# Patient Record
Sex: FEMALE | Race: WHITE | NOT HISPANIC OR LATINO | URBAN - NONMETROPOLITAN AREA
[De-identification: names, ages, dates, MRNs, and addresses within clinical notes are randomized per-mention and may not be internally consistent; named-entity substitution may affect disease eponyms.]

---

## 2022-03-02 ENCOUNTER — CLAIMS CREATED FROM THE CLAIM WINDOW (OUTPATIENT)
Dept: URBAN - NONMETROPOLITAN AREA CLINIC 9 | Facility: CLINIC | Age: 62
End: 2022-03-02
Payer: COMMERCIAL

## 2022-03-02 VITALS
HEIGHT: 66 IN | HEART RATE: 71 BPM | WEIGHT: 182 LBS | DIASTOLIC BLOOD PRESSURE: 76 MMHG | BODY MASS INDEX: 29.25 KG/M2 | SYSTOLIC BLOOD PRESSURE: 153 MMHG | OXYGEN SATURATION: 96 %

## 2022-03-02 DIAGNOSIS — K21.9 GASTROESOPHAGEAL REFLUX DISEASE, UNSPECIFIED WHETHER ESOPHAGITIS PRESENT: ICD-10-CM

## 2022-03-02 DIAGNOSIS — R19.5 CHANGE IN STOOL CALIBER: ICD-10-CM

## 2022-03-02 DIAGNOSIS — Z12.11 COLON CANCER SCREENING: ICD-10-CM

## 2022-03-02 PROCEDURE — 99204 OFFICE O/P NEW MOD 45 MIN: CPT | Performed by: INTERNAL MEDICINE

## 2022-03-02 PROCEDURE — 99204 OFFICE O/P NEW MOD 45 MIN: CPT | Performed by: PHYSICIAN ASSISTANT

## 2022-03-02 RX ORDER — SODIUM SULFATE, MAGNESIUM SULFATE, AND POTASSIUM CHLORIDE 17.75; 2.7; 2.25 G/1; G/1; G/1
12 TABLETS TABLET ORAL
Qty: 24 TABLETS | Refills: 0 | OUTPATIENT
Start: 2022-03-02 | End: 2022-03-03

## 2022-03-02 RX ORDER — FAMOTIDINE 20 MG/1
1 TABLET AT BEDTIME AS NEEDED TABLET, FILM COATED ORAL ONCE A DAY
Status: ACTIVE | COMMUNITY

## 2022-03-02 RX ORDER — PANTOPRAZOLE SODIUM 40 MG/1
1 TABLET TABLET, DELAYED RELEASE ORAL
Qty: 30 | Refills: 3 | OUTPATIENT
Start: 2022-03-02

## 2022-03-02 NOTE — PHYSICAL EXAM NECK/THYROID:
normal appearance , without tenderness upon palpation , no deformities , trachea midline , Thyroid normal size , no thyroid nodules , no masses , no JVD , thyroid nontender pt is harm to self

## 2022-03-02 NOTE — HPI-TODAY'S VISIT:
Lenora Costello is a 62 y/o female who presents to discuss a few concerns. First, she mentions over the last year that she has had a lot more reflux symptoms. Her dentist also mentioned seeing some changes in her teeth concerning for reflux, and advised that she start taking Pepcid nightly which she has been on for the past year. She continues, however, to have break through symptoms several times per week with burning in the upper abdomen and chest and a metallic taste in her mouth; no dysphagia; no NSAID use. She denies any significant nocturnal symptoms. She has never had an EGD. She also mentions intermittent smaller caliber stool which she has noticed since June. She takes a fiber supplement in her coffee daily; stil having a movement daily and denies blood in stool. Her last colonoscopy was 10 years ago and she denies personal h/o polyps or known family h/o CRC or polyps. Weight is stable.

## 2022-04-27 ENCOUNTER — OFFICE VISIT (OUTPATIENT)
Dept: URBAN - NONMETROPOLITAN AREA MEDICAL CENTER 2 | Facility: MEDICAL CENTER | Age: 62
End: 2022-04-27
Payer: COMMERCIAL

## 2022-04-27 DIAGNOSIS — D12.5 ADENOMA OF SIGMOID COLON: ICD-10-CM

## 2022-04-27 DIAGNOSIS — Z12.11 COLON CANCER SCREENING: ICD-10-CM

## 2022-04-27 DIAGNOSIS — K25.9 ANTRAL ULCER: ICD-10-CM

## 2022-04-27 DIAGNOSIS — K21.9 ACID REFLUX: ICD-10-CM

## 2022-04-27 PROCEDURE — 43239 EGD BIOPSY SINGLE/MULTIPLE: CPT | Performed by: INTERNAL MEDICINE

## 2022-04-27 PROCEDURE — 45385 COLONOSCOPY W/LESION REMOVAL: CPT | Performed by: INTERNAL MEDICINE

## 2022-06-16 ENCOUNTER — WEB ENCOUNTER (OUTPATIENT)
Dept: URBAN - NONMETROPOLITAN AREA CLINIC 9 | Facility: CLINIC | Age: 62
End: 2022-06-16

## 2022-06-22 ENCOUNTER — OFFICE VISIT (OUTPATIENT)
Dept: URBAN - NONMETROPOLITAN AREA CLINIC 9 | Facility: CLINIC | Age: 62
End: 2022-06-22
Payer: COMMERCIAL

## 2022-06-22 VITALS
BODY MASS INDEX: 29.18 KG/M2 | WEIGHT: 181.6 LBS | HEIGHT: 66 IN | SYSTOLIC BLOOD PRESSURE: 140 MMHG | HEART RATE: 77 BPM | DIASTOLIC BLOOD PRESSURE: 79 MMHG

## 2022-06-22 DIAGNOSIS — K64.9 HEMORRHOIDS WITHOUT COMPLICATION: ICD-10-CM

## 2022-06-22 DIAGNOSIS — Z86.010 PERSONAL HISTORY OF COLONIC POLYPS: ICD-10-CM

## 2022-06-22 DIAGNOSIS — K59.01 SLOW TRANSIT CONSTIPATION: ICD-10-CM

## 2022-06-22 DIAGNOSIS — K21.9 GERD WITHOUT ESOPHAGITIS: ICD-10-CM

## 2022-06-22 PROCEDURE — 99214 OFFICE O/P EST MOD 30 MIN: CPT | Performed by: INTERNAL MEDICINE

## 2022-06-22 PROCEDURE — 46221 LIGATION OF HEMORRHOID(S): CPT | Performed by: INTERNAL MEDICINE

## 2022-06-22 RX ORDER — PANTOPRAZOLE SODIUM 40 MG/1
1 TABLET TABLET, DELAYED RELEASE ORAL
Qty: 30 | Refills: 3 | Status: ACTIVE | COMMUNITY
Start: 2022-03-02

## 2022-06-22 RX ORDER — FAMOTIDINE 20 MG/1
1 TABLET AT BEDTIME AS NEEDED TABLET, FILM COATED ORAL ONCE A DAY
Status: ACTIVE | COMMUNITY

## 2022-06-22 RX ORDER — PANTOPRAZOLE SODIUM 40 MG/1
1 TABLET TABLET, DELAYED RELEASE ORAL
OUTPATIENT
Start: 2022-03-02

## 2022-06-22 NOTE — HPI-TODAY'S VISIT:
Pt seen 3/2022 for one year of GERD symptoms with acid reflux and enamel changes on teeth despite Pepcid nightly; also having intermittent smaller caliber stool  with last CRCS 10  years ago. 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 Pt now presents for a follow up: GERD better on Pantoprazole once a day. Constipation better with fiber. One hemorrhoid hangs out and can cause discomfort and requesting therapy for.

## 2022-06-27 ENCOUNTER — WEB ENCOUNTER (OUTPATIENT)
Dept: URBAN - NONMETROPOLITAN AREA CLINIC 9 | Facility: CLINIC | Age: 62
End: 2022-06-27

## 2022-06-27 RX ORDER — PANTOPRAZOLE SODIUM 40 MG/1
1 TABLET TABLET, DELAYED RELEASE ORAL
Qty: 90 TABLET | Refills: 1
Start: 2022-03-02

## 2022-07-07 ENCOUNTER — OFFICE VISIT (OUTPATIENT)
Dept: URBAN - METROPOLITAN AREA CLINIC 46 | Facility: CLINIC | Age: 62
End: 2022-07-07

## 2022-08-31 ENCOUNTER — OFFICE VISIT (OUTPATIENT)
Dept: URBAN - NONMETROPOLITAN AREA CLINIC 9 | Facility: CLINIC | Age: 62
End: 2022-08-31

## 2022-09-14 ENCOUNTER — OFFICE VISIT (OUTPATIENT)
Dept: URBAN - NONMETROPOLITAN AREA CLINIC 9 | Facility: CLINIC | Age: 62
End: 2022-09-14
Payer: COMMERCIAL

## 2022-09-14 VITALS
HEIGHT: 66 IN | WEIGHT: 178.4 LBS | HEART RATE: 71 BPM | BODY MASS INDEX: 28.67 KG/M2 | DIASTOLIC BLOOD PRESSURE: 94 MMHG | SYSTOLIC BLOOD PRESSURE: 152 MMHG

## 2022-09-14 DIAGNOSIS — Z86.010 PERSONAL HISTORY OF COLONIC POLYPS: ICD-10-CM

## 2022-09-14 DIAGNOSIS — K64.9 HEMORRHOIDS WITHOUT COMPLICATION: ICD-10-CM

## 2022-09-14 DIAGNOSIS — K59.01 SLOW TRANSIT CONSTIPATION: ICD-10-CM

## 2022-09-14 DIAGNOSIS — K21.9 GERD WITHOUT ESOPHAGITIS: ICD-10-CM

## 2022-09-14 PROCEDURE — 99213 OFFICE O/P EST LOW 20 MIN: CPT | Performed by: INTERNAL MEDICINE

## 2022-09-14 RX ORDER — PANTOPRAZOLE SODIUM 40 MG/1
1 TABLET TABLET, DELAYED RELEASE ORAL
OUTPATIENT

## 2022-09-14 RX ORDER — PANTOPRAZOLE SODIUM 40 MG/1
1 TABLET TABLET, DELAYED RELEASE ORAL
Qty: 90 TABLET | Refills: 1 | Status: ACTIVE | COMMUNITY
Start: 2022-03-02

## 2022-09-14 NOTE — HPI-TODAY'S VISIT:
3/2/2022: Lenora Costello is a 62 y/o female who presents to discuss a few concerns. First, she mentions over the last year that she has had a lot more reflux symptoms. Her dentist also mentioned seeing some changes in her teeth concerning for reflux, and advised that she start taking Pepcid nightly which she has been on for the past year. She continues, however, to have break through symptoms several times per week with burning in the upper abdomen and chest and a metallic taste in her mouth; no dysphagia; no NSAID use. She denies any significant nocturnal symptoms. She has never had an EGD. She also mentions intermittent smaller caliber stool which she has noticed since June. She takes a fiber supplement in her coffee daily; stil having a movement daily and denies blood in stool. Her last colonoscopy was 10 years ago and she denies personal h/o polyps or known family h/o CRC or polyps. Weight is stable. EGD and colonoscopy advisd and Pantoprazole 40mg daily started   4/7/2022: EGD and colonoscopy performed. H pylori negative gastropathy; colon adenomas; hemorrhoids.   6/22/2022: Pt now presents for a follow up: GERD better on Pantoprazole once a day. Constipation better with fiber. One hemorrhoid hangs out and can cause discomfort and requesting therapy for. CRH banding in the LL position.   9/12/2022: Pt now for a follow up. GERD still controlled with Pantoprazole 40mg daily. Stool normal caliber with fiber. Banding helped but hemorrhoids still spontaneously flare.

## 2022-09-22 ENCOUNTER — TELEPHONE ENCOUNTER (OUTPATIENT)
Dept: URBAN - METROPOLITAN AREA CLINIC 46 | Facility: CLINIC | Age: 62
End: 2022-09-22

## 2022-09-29 ENCOUNTER — WEB ENCOUNTER (OUTPATIENT)
Dept: URBAN - METROPOLITAN AREA SURGERY CENTER 28 | Facility: SURGERY CENTER | Age: 62
End: 2022-09-29

## 2022-10-03 ENCOUNTER — OFFICE VISIT (OUTPATIENT)
Dept: URBAN - METROPOLITAN AREA SURGERY CENTER 28 | Facility: SURGERY CENTER | Age: 62
End: 2022-10-03
Payer: COMMERCIAL

## 2022-10-03 DIAGNOSIS — K92.1 ACUTE MELENA: ICD-10-CM

## 2022-10-03 DIAGNOSIS — K64.2 BLEEDING GRADE III HEMORRHOIDS: ICD-10-CM

## 2022-10-03 PROCEDURE — G8907 PT DOC NO EVENTS ON DISCHARG: HCPCS | Performed by: INTERNAL MEDICINE

## 2022-10-03 PROCEDURE — 45330 DIAGNOSTIC SIGMOIDOSCOPY: CPT | Performed by: INTERNAL MEDICINE

## 2022-10-03 PROCEDURE — 46930 DESTROY INTERNAL HEMORRHOIDS: CPT | Performed by: INTERNAL MEDICINE

## 2022-10-03 RX ORDER — HYDROCODONE BITARTRATE AND ACETAMINOPHEN 7.5; 325 MG/1; MG/1
1 TABLET AS NEEDED TABLET ORAL
Qty: 12 TABLET | Refills: 0 | OUTPATIENT
Start: 2022-10-03 | End: 2022-10-06

## 2022-10-03 RX ORDER — PANTOPRAZOLE SODIUM 40 MG/1
1 TABLET TABLET, DELAYED RELEASE ORAL
Status: ACTIVE | COMMUNITY

## 2022-12-06 ENCOUNTER — DASHBOARD ENCOUNTERS (OUTPATIENT)
Age: 62
End: 2022-12-06

## 2022-12-07 ENCOUNTER — OFFICE VISIT (OUTPATIENT)
Dept: URBAN - NONMETROPOLITAN AREA CLINIC 9 | Facility: CLINIC | Age: 62
End: 2022-12-07
Payer: COMMERCIAL

## 2022-12-07 ENCOUNTER — OFFICE VISIT (OUTPATIENT)
Dept: URBAN - NONMETROPOLITAN AREA CLINIC 9 | Facility: CLINIC | Age: 62
End: 2022-12-07

## 2022-12-07 VITALS
HEIGHT: 66 IN | WEIGHT: 178.2 LBS | DIASTOLIC BLOOD PRESSURE: 83 MMHG | BODY MASS INDEX: 28.64 KG/M2 | HEART RATE: 70 BPM | SYSTOLIC BLOOD PRESSURE: 140 MMHG

## 2022-12-07 DIAGNOSIS — K21.9 GERD WITHOUT ESOPHAGITIS: ICD-10-CM

## 2022-12-07 DIAGNOSIS — K59.01 SLOW TRANSIT CONSTIPATION: ICD-10-CM

## 2022-12-07 DIAGNOSIS — K64.9 HEMORRHOIDS WITHOUT COMPLICATION: ICD-10-CM

## 2022-12-07 DIAGNOSIS — Z86.010 PERSONAL HISTORY OF COLONIC POLYPS: ICD-10-CM

## 2022-12-07 PROBLEM — 266435005: Status: ACTIVE | Noted: 2022-06-22

## 2022-12-07 PROBLEM — 428283002: Status: ACTIVE | Noted: 2022-06-22

## 2022-12-07 PROBLEM — 35298007: Status: ACTIVE | Noted: 2022-06-22

## 2022-12-07 PROCEDURE — 99213 OFFICE O/P EST LOW 20 MIN: CPT | Performed by: INTERNAL MEDICINE

## 2022-12-07 RX ORDER — PANTOPRAZOLE SODIUM 40 MG/1
1 TABLET TABLET, DELAYED RELEASE ORAL
OUTPATIENT

## 2022-12-07 RX ORDER — PANTOPRAZOLE SODIUM 40 MG/1
1 TABLET TABLET, DELAYED RELEASE ORAL
Status: ACTIVE | COMMUNITY

## 2022-12-07 NOTE — HPI-TODAY'S VISIT:
3/2/2022: Lenora Costello is a 62 y/o female who presents to discuss a few concerns. First, she mentions over the last year that she has had a lot more reflux symptoms. Her dentist also mentioned seeing some changes in her teeth concerning for reflux, and advised that she start taking Pepcid nightly which she has been on for the past year. She continues, however, to have break through symptoms several times per week with burning in the upper abdomen and chest and a metallic taste in her mouth; no dysphagia; no NSAID use. She denies any significant nocturnal symptoms. She has never had an EGD. She also mentions intermittent smaller caliber stool which she has noticed since June. She takes a fiber supplement in her coffee daily; stil having a movement daily and denies blood in stool. Her last colonoscopy was 10 years ago and she denies personal h/o polyps or known family h/o CRC or polyps. Weight is stable. EGD and colonoscopy advisd and Pantoprazole 40mg daily started   4/7/2022: EGD and colonoscopy performed. H pylori negative gastropathy; colon adenomas; hemorrhoids.   6/22/2022: Pt now presents for a follow up: GERD better on Pantoprazole once a day. Constipation better with fiber. One hemorrhoid hangs out and can cause discomfort and requesting therapy for. CRH banding in the LL position.   9/12/2022: Pt now for a follow up. GERD still controlled with Pantoprazole 40mg daily. Stool normal caliber with fiber. Banding helped but hemorrhoids still spontaneously flare. HET advised ok to wean PPI  10/3/2022; HET performed,   12/7/2022: Pt now for a follow up. States feels "wonderful" since having HET. Bulge much improved. No casimiro-procedure pain or bleeding. States GERD still persists and taking Pantoprazole once a day. recently having to take Pepcid prn for inciting food but no alarm symptoms.

## 2022-12-07 NOTE — HPI-TODAY'S VISIT:
3/2/2022: Lenora Costello is a 60 y/o female who presents to discuss a few concerns. First, she mentions over the last year that she has had a lot more reflux symptoms. Her dentist also mentioned seeing some changes in her teeth concerning for reflux, and advised that she start taking Pepcid nightly which she has been on for the past year. She continues, however, to have break through symptoms several times per week with burning in the upper abdomen and chest and a metallic taste in her mouth; no dysphagia; no NSAID use. She denies any significant nocturnal symptoms. She has never had an EGD. She also mentions intermittent smaller caliber stool which she has noticed since June. She takes a fiber supplement in her coffee daily; stil having a movement daily and denies blood in stool. Her last colonoscopy was 10 years ago and she denies personal h/o polyps or known family h/o CRC or polyps. Weight is stable. EGD and colonoscopy advisd and Pantoprazole 40mg daily started   4/7/2022: EGD and colonoscopy performed. H pylori negative gastropathy; colon adenomas; hemorrhoids.   6/22/2022: Pt now presents for a follow up: GERD better on Pantoprazole once a day. Constipation better with fiber. One hemorrhoid hangs out and can cause discomfort and requesting therapy for. CRH banding in the LL position.   9/12/2022: Pt now for a follow up. GERD still controlled with Pantoprazole 40mg daily. Stool normal caliber with fiber. Banding helped but hemorrhoids still spontaneously flare and asking for definitive therapy.   9/28/22: HET perfomed  12/7/2022: Now for a follow up

## 2023-01-10 ENCOUNTER — ERX REFILL RESPONSE (OUTPATIENT)
Dept: URBAN - NONMETROPOLITAN AREA CLINIC 9 | Facility: CLINIC | Age: 63
End: 2023-01-10

## 2023-01-10 RX ORDER — PANTOPRAZOLE SODIUM 40 MG/1
TAKE ONE TABLET BY MOUTH EVERY DAY 30 MINUTES PRIOR TO BREAKFAST TABLET, DELAYED RELEASE ORAL
Qty: 90 TABLET | Refills: 2 | OUTPATIENT

## 2023-01-10 RX ORDER — PANTOPRAZOLE SODIUM 40 MG/1
1 TABLET TABLET, DELAYED RELEASE ORAL ONCE A DAY
Qty: 90 | Refills: 3 | OUTPATIENT